# Patient Record
Sex: FEMALE | Race: BLACK OR AFRICAN AMERICAN | NOT HISPANIC OR LATINO
[De-identification: names, ages, dates, MRNs, and addresses within clinical notes are randomized per-mention and may not be internally consistent; named-entity substitution may affect disease eponyms.]

---

## 2024-08-31 ENCOUNTER — APPOINTMENT (OUTPATIENT)
Dept: ORTHOPEDIC SURGERY | Facility: CLINIC | Age: 54
End: 2024-08-31
Payer: COMMERCIAL

## 2024-08-31 DIAGNOSIS — S80.02XA CONTUSION OF LEFT KNEE, INITIAL ENCOUNTER: ICD-10-CM

## 2024-08-31 DIAGNOSIS — S80.01XA CONTUSION OF RIGHT KNEE, INITIAL ENCOUNTER: ICD-10-CM

## 2024-08-31 PROBLEM — Z00.00 ENCOUNTER FOR PREVENTIVE HEALTH EXAMINATION: Status: ACTIVE | Noted: 2024-08-31

## 2024-08-31 PROCEDURE — 73562 X-RAY EXAM OF KNEE 3: CPT | Mod: 50

## 2024-08-31 PROCEDURE — 99203 OFFICE O/P NEW LOW 30 MIN: CPT

## 2024-08-31 RX ORDER — VALACYCLOVIR 500 MG/1
TABLET, FILM COATED ORAL
Refills: 0 | Status: ACTIVE | COMMUNITY

## 2024-08-31 RX ORDER — SPIRONOLACTONE 50 MG/1
TABLET ORAL
Refills: 0 | Status: ACTIVE | COMMUNITY

## 2024-08-31 RX ORDER — NABUMETONE 500 MG/1
500 TABLET, FILM COATED ORAL
Qty: 60 | Refills: 0 | Status: ACTIVE | COMMUNITY
Start: 2024-08-31 | End: 1900-01-01

## 2024-08-31 RX ORDER — ADALIMUMAB-ADAZ 80 MG/.8ML
80 INJECTION, SOLUTION SUBCUTANEOUS
Refills: 0 | Status: ACTIVE | COMMUNITY

## 2024-08-31 NOTE — HISTORY OF PRESENT ILLNESS
[de-identified] : 53-year-old female is here today for bilateral knees.  Patient states she tripped and fell landing on both of her knees a few days ago.  She went to an emergency room at [place] and had x-rays taken and was told to follow-up with orthopedics.  She states she still having a lot of pain mostly in the left knee.  She does not have much pain in the right knee.  She been taking ibuprofen with not much relief.  The pain is worse to the touch and with walking.  She denies any previous injuries to the knees, she denies any numbness tingling or any calf pain.

## 2024-08-31 NOTE — IMAGING
[de-identified] : On examination of her left knee she has swelling and an abrasion over the anterior aspect of the knee.  She is tender to palpation over the patella.  She is able to straight leg raise.  She can flex and extend the knee but has pain and decreased range of motion.  Negative varus and valgus stress test, negative anterior drawer.  She is tender over the medial joint line.  Mild tenderness over the lateral joint line.  No point tenderness over the tibial plateau or the fibular head.  Positive Bailey's.  The calf is soft and nontender.  Sensation is intact throughout, 2+ DP and PT pulses. On examination of her right knee she has minimal swelling, no erythema, no ecchymosis.  Patient over the patella, negative patellar grind.  She is able to straight leg raise.  She has good range of motion of the right knee.  Negative varus valgus stress test, negative anterior drawer.  Tenderness over the medial and lateral joint line.  No tenderness over the tibial plateau or the fibular head.  Negative Bailey's.  The calf is soft and nontender.  Sensation is intact throughout, 2+ DP and PT pulses.  X-rays taken in the office today of the bilateral knees show degenerative changes with joint space narrowing and osteophytes.  No acute fractures, dislocations, or other bony abnormalities noted.

## 2024-08-31 NOTE — DISCUSSION/SUMMARY
[de-identified] : At this time she can a rest and ice, she can alternate with warm compresses.  I sent a prescription for an anti-inflammatory to the pharmacy.  I would like to get an MRI of her left knee to rule out a meniscal tear or an occult fracture.  She can call me after the MRI to go over the results.  Will schedule her follow-up for repeat evaluation. Patient will call me if any other problems or concerns.  Patient verbalized understanding and agreed with the plan, all questions were answered in the office today.

## 2024-10-03 ENCOUNTER — APPOINTMENT (OUTPATIENT)
Dept: ORTHOPEDIC SURGERY | Facility: CLINIC | Age: 54
End: 2024-10-03
Payer: COMMERCIAL

## 2024-10-03 DIAGNOSIS — S80.02XA CONTUSION OF LEFT KNEE, INITIAL ENCOUNTER: ICD-10-CM

## 2024-10-03 PROCEDURE — 99204 OFFICE O/P NEW MOD 45 MIN: CPT

## 2024-10-03 NOTE — HISTORY OF PRESENT ILLNESS
[de-identified] : Patient here for evaluation left knee pain. Complains of joint line pain Positive mechanical symptoms and catching  NAD Left knee No skin breakdown Latera joint line ttp Positive jasmyn Negative lachman Negative varus/valgus instability ROM 0-130 Pain with forced extension and flexion NVI Compartments soft and NT  Xray reviewed and significant for right knee mild degenerative changes  mri left knee: lmt, chondromalacia  plan went over findings epxlained the imaging tx options explained op vs nonop will proceed with sx  left knee arthroscopy, lateral meniscectomy  Operative and nonoperative options discussed with patient. Surgical risks, benefits, and alternatives explained. Surgical risks include but are not exclusive to bleeding, infection, neurovascular damage, continued pain, stiffness, scarring, rsd, dvt/pe, potential failure of surgery that may require further surgery in the future. I went over incisions and rehabilitation. All questions answered.

## 2024-11-07 ENCOUNTER — APPOINTMENT (OUTPATIENT)
Dept: ORTHOPEDIC SURGERY | Facility: CLINIC | Age: 54
End: 2024-11-07

## 2024-12-03 ENCOUNTER — APPOINTMENT (OUTPATIENT)
Dept: ORTHOPEDIC SURGERY | Facility: CLINIC | Age: 54
End: 2024-12-03

## 2024-12-03 DIAGNOSIS — S80.02XA CONTUSION OF LEFT KNEE, INITIAL ENCOUNTER: ICD-10-CM

## 2024-12-03 PROCEDURE — 20611 DRAIN/INJ JOINT/BURSA W/US: CPT | Mod: LT

## 2024-12-03 PROCEDURE — 99214 OFFICE O/P EST MOD 30 MIN: CPT | Mod: 25

## 2025-01-28 ENCOUNTER — APPOINTMENT (OUTPATIENT)
Dept: ORTHOPEDIC SURGERY | Facility: CLINIC | Age: 55
End: 2025-01-28

## 2025-01-28 DIAGNOSIS — S80.02XA CONTUSION OF LEFT KNEE, INITIAL ENCOUNTER: ICD-10-CM

## 2025-01-28 PROCEDURE — 99213 OFFICE O/P EST LOW 20 MIN: CPT | Mod: 25

## 2025-01-28 PROCEDURE — 20611 DRAIN/INJ JOINT/BURSA W/US: CPT | Mod: LT

## 2025-05-13 ENCOUNTER — APPOINTMENT (OUTPATIENT)
Dept: ORTHOPEDIC SURGERY | Facility: CLINIC | Age: 55
End: 2025-05-13